# Patient Record
Sex: FEMALE | URBAN - METROPOLITAN AREA
[De-identification: names, ages, dates, MRNs, and addresses within clinical notes are randomized per-mention and may not be internally consistent; named-entity substitution may affect disease eponyms.]

---

## 2019-12-05 ENCOUNTER — APPOINTMENT (OUTPATIENT)
Age: 29
Setting detail: DERMATOLOGY
End: 2019-12-05

## 2019-12-05 DIAGNOSIS — Z41.9 ENCOUNTER FOR PROCEDURE FOR PURPOSES OTHER THAN REMEDYING HEALTH STATE, UNSPECIFIED: ICD-10-CM

## 2019-12-05 PROCEDURE — OTHER LASER HAIR REMOVAL: OTHER

## 2019-12-05 NOTE — HPI: COSMETIC (LASER HAIR REMOVAL)
Eye Color: brown
Have You Had Laser Hair Removal Before?: has had previous treatment
When Outside In The Sun, Do You...: rarely burns, tans with ease
Additional History: Pt denies new medications, sun exposure, sunless tanning, waxing, or pregnancy.
When Was Your Last Laser Treatment?: 09/26/2019

## 2019-12-05 NOTE — PROCEDURE: LASER HAIR REMOVAL
Treatment Number: 6
Number Of Prepaid Treatments (Will Not Render If 0): 0
Post-Procedure Care: Immediate endpoint: perifollicular erythema and edema. Triamcinolone cream applied. Post care reviewed with patient.
Pulse Duration: 23 ms
Fluence (Will Not Render If 0): 50
Pre-Procedure: Prior to proceeding the treatment areas were cleaned and all present put on their eye protection. Pt denies new medications, sun exposure, sunless tanning, waxing, or pregnancy.
Spot Size: 9 mm
Render Post-Care In The Note: No
Laser Type: Nd:Yag 1064nm
Post-Care Instructions: I reviewed with the patient in detail post-care instructions. Patient should avoid sun for a minimum of 4 weeks before and after treatment.
Pulse Duration: 3 ms
Total Pulses: double pulse, frequency 2.1
Spot Size: 12 mm
Fluence (Will Not Render If 0): 18
Total Pulses: single pulse, frequency 1.0
Fluence (Will Not Render If 0): 20
Detail Level: Detailed
Cooling Override: Shama + Coupling Gel
Consent: Written consent obtained, risks reviewed including but not limited to crusting, scabbing, blistering, scarring, darker or lighter pigmentary change, paradoxical hair regrowth, incomplete removal of hair and infection.
External Cooling Fan Speed: 5
Cooling: DCD 40/30
Tolerated Procedure (Optional): Tolerated Well
Cooling: DCD setting
External Cooling: Shama Cryo 5
Number Of Prepaid Treatments (Will Not Render If 0): 7

## 2020-01-30 ENCOUNTER — APPOINTMENT (OUTPATIENT)
Age: 30
Setting detail: DERMATOLOGY
End: 2020-01-30

## 2020-01-30 DIAGNOSIS — Z41.9 ENCOUNTER FOR PROCEDURE FOR PURPOSES OTHER THAN REMEDYING HEALTH STATE, UNSPECIFIED: ICD-10-CM

## 2020-01-30 PROCEDURE — OTHER LASER HAIR REMOVAL: OTHER

## 2020-01-30 NOTE — PROCEDURE: LASER HAIR REMOVAL
Tolerated Procedure (Optional): Tolerated Well
Post-Procedure Care: Immediate endpoint: perifollicular erythema and edema. Triamcinolone cream applied. Post care reviewed with patient.
Treatment Number: 0
Fluence (Will Not Render If 0): 20
Render Post-Care In The Note: No
Cooling: DCD 40/30
Total Pulses: double pulse, frequency 2.2
External Cooling: Shama Cryo 5
External Cooling Fan Speed: 5
Spot Size: 9 mm
Consent: Written consent obtained, risks reviewed including but not limited to crusting, scabbing, blistering, scarring, darker or lighter pigmentary change, paradoxical hair regrowth, incomplete removal of hair and infection.
Pulse Duration: 25 ms
Fluence (Will Not Render If 0): 50
Fluence (Will Not Render If 0): 18
Cooling Override: Shama + Coupling Gel
Post-Care Instructions: I reviewed with the patient in detail post-care instructions. Patient should avoid sun for a minimum of 4 weeks before and after treatment.
Number Of Prepaid Treatments (Will Not Render If 0): 7
Spot Size: 12 mm
Pulse Duration: 3 ms
Cooling: DCD setting
Detail Level: Detailed
Total Pulses: single pulse, frequency 1.0
Laser Type: Nd:Yag 1064nm
Pre-Procedure: Prior to proceeding the treatment areas were cleaned and all present put on their eye protection. Pt denies new medications, sun exposure, sunless tanning, waxing, or pregnancy.